# Patient Record
Sex: FEMALE | Race: WHITE | Employment: OTHER
[De-identification: names, ages, dates, MRNs, and addresses within clinical notes are randomized per-mention and may not be internally consistent; named-entity substitution may affect disease eponyms.]

---

## 2022-03-18 PROBLEM — E86.1 HYPOVOLEMIA: Status: ACTIVE | Noted: 2017-04-17

## 2022-03-19 PROBLEM — L03.90 CELLULITIS: Status: ACTIVE | Noted: 2017-03-19

## 2022-03-19 PROBLEM — R19.7 DIARRHEA OF PRESUMED INFECTIOUS ORIGIN: Status: ACTIVE | Noted: 2021-07-04

## 2022-03-19 PROBLEM — A04.71 RECURRENT CLOSTRIDIOIDES DIFFICILE DIARRHEA: Status: ACTIVE | Noted: 2021-07-04

## 2022-03-19 PROBLEM — A09 INFECTIOUS DIARRHEA IN ADULT PATIENT: Status: ACTIVE | Noted: 2017-04-17

## 2022-03-19 PROBLEM — N17.9 ACUTE KIDNEY INJURY (HCC): Status: ACTIVE | Noted: 2017-04-17

## 2022-03-19 PROBLEM — A04.72 CLOSTRIDIUM DIFFICILE DIARRHEA: Status: ACTIVE | Noted: 2019-05-15

## 2022-08-14 PROBLEM — I48.92 ATRIAL FLUTTER WITH RAPID VENTRICULAR RESPONSE (HCC): Status: ACTIVE | Noted: 2022-08-14

## 2023-02-04 PROBLEM — E87.6 ACUTE HYPOKALEMIA: Status: ACTIVE | Noted: 2023-02-04

## 2023-02-04 PROBLEM — I50.9 ACUTE EXACERBATION OF CHF (CONGESTIVE HEART FAILURE) (HCC): Status: ACTIVE | Noted: 2023-02-04

## 2023-02-04 PROBLEM — R60.0 PERIPHERAL EDEMA: Status: ACTIVE | Noted: 2023-02-04

## 2024-01-01 ENCOUNTER — HOSPITAL ENCOUNTER (EMERGENCY)
Facility: HOSPITAL | Age: 79
End: 2024-04-02
Attending: STUDENT IN AN ORGANIZED HEALTH CARE EDUCATION/TRAINING PROGRAM
Payer: MEDICARE

## 2024-01-01 DIAGNOSIS — J96.02 ACUTE RESPIRATORY FAILURE WITH HYPOXIA AND HYPERCAPNIA (HCC): ICD-10-CM

## 2024-01-01 DIAGNOSIS — I46.9 CARDIAC ARREST (HCC): Primary | ICD-10-CM

## 2024-01-01 DIAGNOSIS — J96.01 ACUTE RESPIRATORY FAILURE WITH HYPOXIA AND HYPERCAPNIA (HCC): ICD-10-CM

## 2024-01-01 PROCEDURE — 31500 INSERT EMERGENCY AIRWAY: CPT

## 2024-01-01 PROCEDURE — 99285 EMERGENCY DEPT VISIT HI MDM: CPT

## 2024-01-01 PROCEDURE — 94761 N-INVAS EAR/PLS OXIMETRY MLT: CPT

## 2024-01-01 PROCEDURE — 2500000003 HC RX 250 WO HCPCS: Performed by: STUDENT IN AN ORGANIZED HEALTH CARE EDUCATION/TRAINING PROGRAM

## 2024-01-01 PROCEDURE — 6360000002 HC RX W HCPCS: Performed by: STUDENT IN AN ORGANIZED HEALTH CARE EDUCATION/TRAINING PROGRAM

## 2024-01-01 RX ORDER — EPINEPHRINE IN SOD CHLOR,ISO 1 MG/10 ML
SYRINGE (ML) INTRAVENOUS DAILY PRN
Status: COMPLETED | OUTPATIENT
Start: 2024-01-01 | End: 2024-01-01

## 2024-01-01 RX ADMIN — EPINEPHRINE 1 MG: 0.1 INJECTION, SOLUTION ENDOTRACHEAL; INTRACARDIAC; INTRAVENOUS at 06:26

## 2024-01-01 RX ADMIN — SODIUM BICARBONATE 50 MEQ: 84 INJECTION, SOLUTION INTRAVENOUS at 06:25

## 2024-01-01 RX ADMIN — EPINEPHRINE 1 MG: 0.1 INJECTION, SOLUTION ENDOTRACHEAL; INTRACARDIAC; INTRAVENOUS at 06:20

## 2024-04-02 NOTE — ED NOTES
PATIENT HAD BODY PREPARED, PATIENT ALL ATTACHMENT REMOVED. RELATIVE PREVIOUSLY CONTACTED AND UPDATED.     [facility]  AND [facility]DITIONAL STAFF, PATIENT AND BELONGINGS TRANSPORTED AND LABELED

## 2024-04-02 NOTE — ED NOTES
Pt to ED via EMS per medic respiratory distress 80% on oxygen . Pt arrested while enroute [id number] pt was PEA given 3mg EPI enroue and 1L NS bolus . Pt arrived rakesh in place

## 2024-04-02 NOTE — ED PROVIDER NOTES
EMERGENCY DEPARTMENT HISTORY AND PHYSICAL EXAM    8:19 AM      Date: 4/2/2024  Patient Name: Omaira Morgan    History of Presenting Illness     Chief Complaint   Patient presents with    Cardiac Arrest       History From: EMS  78-year-old female presents via ambulance with CPR in progress for ongoing cardiac arrest.  Patient lives and I nursing home.  EMS was called for respiratory distress.  When they initially arrived on scene the patient was alert and oriented but hypoxic with a saturation of 80%.  They placed patient on oxygen and were preparing for transfer.  The patient suddenly had a change in her level of consciousness and no pulse was appreciated.  CPR was initiated and patient was transferred with a Aniket device.  Patient received 1 mg of epinephrine through the IO followed by 2 mg of epinephrine and a normal saline bag administered during transport.    Patient had a size 4 i-gel in place and was easy to bag during transport.             Nursing Notes were all reviewed and agreed with or any disagreements were addressed in the HPI.    PCP: Kemal Montenegro MD    No current facility-administered medications for this encounter.     Current Outpatient Medications   Medication Sig Dispense Refill    VITAMIN D, CHOLECALCIFEROL, PO Take 1 capsule by mouth daily      DOCOSAHEXAENOIC ACID-EPA PO Take by mouth      Probiotic Product (ACIDOPHILUS/BIFIDUS PROBIOTIC PO) Take by mouth 3 times daily (before meals)      LEVOTHYROXINE SODIUM PO Take by mouth      psyllium husk-aspartame (METAMUCIL FIBER) 51.7 % PACK packet Take by mouth      METOPROLOL SUCCINATE PO Take 12.5 mg by mouth daily      albuterol sulfate HFA (PROVENTIL;VENTOLIN;PROAIR) 108 (90 Base) MCG/ACT inhaler Inhale into the lungs      ALPRAZolam (XANAX) 1 MG tablet Take by mouth 2 times daily.      apixaban (ELIQUIS) 2.5 MG TABS tablet Take 2.5 mg by mouth 2 times daily      bumetanide (BUMEX) 1 MG tablet Take 1 mg by mouth daily      Calcium

## 2024-04-04 NOTE — PROGRESS NOTES
responded to Death of  Omaira Morgan, who was a 78 y.o.,female,     The  provided the following Interventions:   not paged for death. No family at bedside.      Plan:  Chaplains will continue to follow and will provide pastoral care on an as needed/requested basis and grief support for the family.      omi Read  Spirit[facility]   [phone]